# Patient Record
Sex: MALE | URBAN - METROPOLITAN AREA
[De-identification: names, ages, dates, MRNs, and addresses within clinical notes are randomized per-mention and may not be internally consistent; named-entity substitution may affect disease eponyms.]

---

## 2021-07-19 ENCOUNTER — OFFICE (OUTPATIENT)
Dept: URBAN - METROPOLITAN AREA CLINIC 34 | Facility: CLINIC | Age: 66
End: 2021-07-19

## 2021-07-19 VITALS
DIASTOLIC BLOOD PRESSURE: 90 MMHG | SYSTOLIC BLOOD PRESSURE: 153 MMHG | HEART RATE: 114 BPM | WEIGHT: 196 LBS | TEMPERATURE: 97.9 F | HEIGHT: 74 IN

## 2021-07-19 DIAGNOSIS — R19.4 CHANGE IN BOWEL HABIT: ICD-10-CM

## 2021-07-19 DIAGNOSIS — K92.1 MELENA: ICD-10-CM

## 2021-07-19 DIAGNOSIS — K21.9 GASTRO-ESOPHAGEAL REFLUX DISEASE WITHOUT ESOPHAGITIS: ICD-10-CM

## 2021-07-19 DIAGNOSIS — D64.9 ANEMIA, UNSPECIFIED: ICD-10-CM

## 2021-07-19 PROCEDURE — 99204 OFFICE O/P NEW MOD 45 MIN: CPT | Performed by: INTERNAL MEDICINE

## 2021-10-20 ENCOUNTER — OFFICE (OUTPATIENT)
Dept: URBAN - METROPOLITAN AREA CLINIC 79 | Facility: CLINIC | Age: 66
End: 2021-10-20
Payer: MEDICARE

## 2021-10-20 VITALS
HEIGHT: 74 IN | WEIGHT: 175 LBS | DIASTOLIC BLOOD PRESSURE: 88 MMHG | TEMPERATURE: 97.1 F | SYSTOLIC BLOOD PRESSURE: 133 MMHG | HEART RATE: 76 BPM

## 2021-10-20 DIAGNOSIS — R19.7 DIARRHEA, UNSPECIFIED: ICD-10-CM

## 2021-10-20 DIAGNOSIS — Z80.0 FAMILY HISTORY OF MALIGNANT NEOPLASM OF DIGESTIVE ORGANS: ICD-10-CM

## 2021-10-20 PROCEDURE — 99214 OFFICE O/P EST MOD 30 MIN: CPT | Performed by: PHYSICIAN ASSISTANT

## 2021-10-20 RX ORDER — CHOLESTYRAMINE 4 G/9G
POWDER, FOR SUSPENSION ORAL
Qty: 240 | Refills: 1 | Status: ACTIVE
Start: 2021-10-20

## 2021-10-20 NOTE — INTERFACERESULTNOTES
Needs treatment with antibiotics. Dificid 200 mg 1 PO BID x 10 days, #20 pills, 0 RF (if not covered, can use vancomycin 125 mg 1 PO 4x/day x 10 days, #40 pills, 0 RF. I would also recommend Florastor probiotics 2x/day for 1-2 months starting now. Will need to make sure his diarrhea resolves prior to his colonoscopy and if not, will need repeat C diff testing ahead of procedure.

## 2021-10-20 NOTE — INTERFACERESULTNOTES
If patient's insurance does not allow for [facility] (GI pathogen test), then please order C diff and stool culture to be done at [facility] and if these are negative, he needs to get colonoscopy done sooner.

## 2021-10-20 NOTE — INTERFACERESULTNOTES
Spoke with patient. He has been on vancomycin. Hasn't seen much improvement in his BMs - still going very frequently. Will have staff fax order for C diff toxin and DNA test to [facility] ([facility]) for him to get done early next week so we have results prior to his colonoscopy (in case this needs to be changed).

## 2021-10-20 NOTE — SERVICEHPINOTES
64 yo male presents with wife and son for bowel issues. He was seen in July for change in bowel habits and had colonoscopy ordered but ended up having surgery for AAA in August with complications of pancreatitis and kidney failure (was in hospital for 7 weeks and is currently on dialysis) and also had an MI. Has been unable to f/u with cardiology yet. Was at [facility] for much of this. jeff aguilar Currently he is having uncontrolled BMs, can sometimes get the urge right before but other times he has no warning. Has 10-20 BMs a day. Denies blood in stools. Uncontrolled BMs started about 4 days ago and frequency is much worse than it was previously, though has had diarrhea issues prior. Reports stool testing at [place] that was reportedly ok, but this was prior to worsened sx. He was on abx for UTI not long ago. Denies fever, abdominal pains, N/V. Does have dry heaves most days which has been going on for 7 months. 
jeff aguilar Denies diabetes. He has h/o heavy ETOH (4 drinks a day for 20 years) and is currently drinking 3 drinks a day, trying to cut back. If he stops completely, he gets the shakes. He is also a smoker. 
jeff aguilar Currently in a wheelchair due to everything he's been through medically in the past couple of months. jeff aguilar No prior colonoscopy.  jeff Samuels was seen at [facility] for a physical exam in April 2021 and was told that he had mild anemia. Mother had colon cancer in her 70s.

## 2021-11-24 LAB — CALPROTECTIN, STOOL: 127 MCG/G — HIGH

## 2021-12-04 LAB
CALPROTECTIN, FECAL: 176 UG/G — HIGH (ref 0–120)
GI PROFILE, STOOL, PCR: ADENOVIRUS F 40/41: NOT DETECTED
GI PROFILE, STOOL, PCR: ASTROVIRUS: NOT DETECTED
GI PROFILE, STOOL, PCR: C DIFFICILE TOXIN A/B: DETECTED
GI PROFILE, STOOL, PCR: CAMPYLOBACTER: NOT DETECTED
GI PROFILE, STOOL, PCR: CRYPTOSPORIDIUM: NOT DETECTED
GI PROFILE, STOOL, PCR: CYCLOSPORA CAYETANENSIS: NOT DETECTED
GI PROFILE, STOOL, PCR: E COLI O157: (no result)
GI PROFILE, STOOL, PCR: ENTAMOEBA HISTOLYTICA: NOT DETECTED
GI PROFILE, STOOL, PCR: ENTEROAGGREGATIVE E COLI: NOT DETECTED
GI PROFILE, STOOL, PCR: ENTEROPATHOGENIC E COLI: NOT DETECTED
GI PROFILE, STOOL, PCR: ENTEROTOXIGENIC E COLI: NOT DETECTED
GI PROFILE, STOOL, PCR: GIARDIA LAMBLIA: NOT DETECTED
GI PROFILE, STOOL, PCR: NOROVIRUS GI/GII: NOT DETECTED
GI PROFILE, STOOL, PCR: PLESIOMONAS SHIGELLOIDES: NOT DETECTED
GI PROFILE, STOOL, PCR: ROTAVIRUS A: NOT DETECTED
GI PROFILE, STOOL, PCR: SALMONELLA: NOT DETECTED
GI PROFILE, STOOL, PCR: SAPOVIRUS: NOT DETECTED
GI PROFILE, STOOL, PCR: SHIGA-TOXIN-PRODUCING E COLI: NOT DETECTED
GI PROFILE, STOOL, PCR: SHIGELLA/ENTEROINVASIVE E COLI: NOT DETECTED
GI PROFILE, STOOL, PCR: VIBRIO CHOLERAE: NOT DETECTED
GI PROFILE, STOOL, PCR: VIBRIO: NOT DETECTED
GI PROFILE, STOOL, PCR: YERSINIA ENTEROCOLITICA: NOT DETECTED
ONE SPECIMEN IDENTIFIER: (no result)

## 2024-08-02 NOTE — INTERFACERESULTNOTES
Patient needs to finish have Stool test done and states that his Cardiologist says there is a possible blockage of the heart. Needs to r/s Colon, will send task to procedures. yes